# Patient Record
Sex: MALE | URBAN - METROPOLITAN AREA
[De-identification: names, ages, dates, MRNs, and addresses within clinical notes are randomized per-mention and may not be internally consistent; named-entity substitution may affect disease eponyms.]

---

## 2022-08-02 ENCOUNTER — OFFICE (OUTPATIENT)
Dept: URBAN - METROPOLITAN AREA CLINIC 12 | Facility: CLINIC | Age: 57
Setting detail: OPHTHALMOLOGY
End: 2022-08-02
Payer: COMMERCIAL

## 2022-08-02 DIAGNOSIS — H20.011: ICD-10-CM

## 2022-08-02 PROCEDURE — 92004 COMPRE OPH EXAM NEW PT 1/>: CPT | Performed by: OPTOMETRIST

## 2022-08-02 ASSESSMENT — KERATOMETRY
OS_K1POWER_DIOPTERS: 43.50
OD_K2POWER_DIOPTERS: 45.00
OD_AXISANGLE_DEGREES: 085
OS_K2POWER_DIOPTERS: 43.75
OS_AXISANGLE_DEGREES: 006
OD_K1POWER_DIOPTERS: 43.75

## 2022-08-02 ASSESSMENT — REFRACTION_AUTOREFRACTION
OS_AXIS: 100
OD_SPHERE: -4.50
OS_SPHERE: -4.75
OD_AXIS: 153
OD_CYLINDER: -0.75
OS_CYLINDER: -0.25

## 2022-08-02 ASSESSMENT — CONFRONTATIONAL VISUAL FIELD TEST (CVF)
OS_FINDINGS: FULL
OD_FINDINGS: FULL

## 2022-08-02 ASSESSMENT — TONOMETRY
OS_IOP_MMHG: 15
OD_IOP_MMHG: 13

## 2022-08-02 ASSESSMENT — SPHEQUIV_DERIVED
OS_SPHEQUIV: -4.875
OD_SPHEQUIV: -4.875

## 2022-08-02 ASSESSMENT — VISUAL ACUITY
OS_BCVA: 20/30-2
OD_BCVA: 20/25

## 2022-08-02 ASSESSMENT — AXIALLENGTH_DERIVED
OS_AL: 25.6033
OD_AL: 25.2829

## 2022-08-03 ENCOUNTER — OFFICE (OUTPATIENT)
Dept: URBAN - METROPOLITAN AREA CLINIC 12 | Facility: CLINIC | Age: 57
Setting detail: OPHTHALMOLOGY
End: 2022-08-03
Payer: COMMERCIAL

## 2022-08-03 ENCOUNTER — RX ONLY (RX ONLY)
Age: 57
End: 2022-08-03

## 2022-08-03 DIAGNOSIS — H20.011: ICD-10-CM

## 2022-08-03 PROCEDURE — 92012 INTRM OPH EXAM EST PATIENT: CPT | Performed by: OPTOMETRIST

## 2022-08-03 ASSESSMENT — REFRACTION_AUTOREFRACTION
OD_CYLINDER: -1.00
OD_AXIS: 027
OS_AXIS: 000
OS_SPHERE: -5.50
OD_SPHERE: -4.75
OS_CYLINDER: SPHERE

## 2022-08-03 ASSESSMENT — TONOMETRY
OD_IOP_MMHG: 11
OS_IOP_MMHG: 14

## 2022-08-03 ASSESSMENT — VISUAL ACUITY
OD_BCVA: 20/30
OS_BCVA: 20/20-1

## 2022-08-03 ASSESSMENT — KERATOMETRY
OD_AXISANGLE_DEGREES: 115
OS_K2POWER_DIOPTERS: 44.25
OD_K1POWER_DIOPTERS: 43.50
OS_K1POWER_DIOPTERS: 44.00
OS_AXISANGLE_DEGREES: 086
OD_K2POWER_DIOPTERS: 44.25

## 2022-08-03 ASSESSMENT — CONFRONTATIONAL VISUAL FIELD TEST (CVF)
OD_FINDINGS: FULL
OS_FINDINGS: FULL

## 2022-08-03 ASSESSMENT — AXIALLENGTH_DERIVED: OD_AL: 25.6674

## 2022-08-03 ASSESSMENT — SPHEQUIV_DERIVED: OD_SPHEQUIV: -5.25

## 2022-08-08 ENCOUNTER — OFFICE (OUTPATIENT)
Dept: URBAN - METROPOLITAN AREA CLINIC 12 | Facility: CLINIC | Age: 57
Setting detail: OPHTHALMOLOGY
End: 2022-08-08
Payer: COMMERCIAL

## 2022-08-08 DIAGNOSIS — H20.011: ICD-10-CM

## 2022-08-08 PROCEDURE — 99213 OFFICE O/P EST LOW 20 MIN: CPT | Performed by: OPTOMETRIST

## 2022-08-08 ASSESSMENT — AXIALLENGTH_DERIVED
OS_AL: 25.7866
OD_AL: 25.7866

## 2022-08-08 ASSESSMENT — SPHEQUIV_DERIVED
OD_SPHEQUIV: -5.625
OS_SPHEQUIV: -5.625

## 2022-08-08 ASSESSMENT — KERATOMETRY
OD_K1POWER_DIOPTERS: 43.75
OD_K2POWER_DIOPTERS: 44.25
OS_K2POWER_DIOPTERS: 44.00
OS_AXISANGLE_DEGREES: 090
OS_K1POWER_DIOPTERS: 44.00
OD_AXISANGLE_DEGREES: 077

## 2022-08-08 ASSESSMENT — CONFRONTATIONAL VISUAL FIELD TEST (CVF)
OS_FINDINGS: FULL
OD_FINDINGS: FULL

## 2022-08-08 ASSESSMENT — REFRACTION_AUTOREFRACTION
OD_AXIS: 019
OS_CYLINDER: -0.25
OD_CYLINDER: -0.75
OS_SPHERE: -5.50
OD_SPHERE: -5.25
OS_AXIS: 137

## 2022-08-08 ASSESSMENT — VISUAL ACUITY
OD_BCVA: 20/25-
OS_BCVA: 20/30+2

## 2022-08-08 ASSESSMENT — TONOMETRY
OS_IOP_MMHG: 15
OD_IOP_MMHG: 17

## 2022-08-15 ENCOUNTER — OFFICE (OUTPATIENT)
Dept: URBAN - METROPOLITAN AREA CLINIC 12 | Facility: CLINIC | Age: 57
Setting detail: OPHTHALMOLOGY
End: 2022-08-15
Payer: COMMERCIAL

## 2022-08-15 DIAGNOSIS — H20.011: ICD-10-CM

## 2022-08-15 PROCEDURE — 99213 OFFICE O/P EST LOW 20 MIN: CPT | Performed by: OPTOMETRIST

## 2022-08-15 ASSESSMENT — KERATOMETRY
OS_K2POWER_DIOPTERS: 44.75
OD_AXISANGLE_DEGREES: 028
OS_AXISANGLE_DEGREES: 084
OD_K1POWER_DIOPTERS: 43.25
OD_K2POWER_DIOPTERS: 44.00
OS_K1POWER_DIOPTERS: 44.00

## 2022-08-15 ASSESSMENT — REFRACTION_AUTOREFRACTION
OS_AXIS: 169
OD_AXIS: 009
OS_SPHERE: -5.25
OS_CYLINDER: -0.75
OD_SPHERE: -5.25
OD_CYLINDER: -1.25

## 2022-08-15 ASSESSMENT — VISUAL ACUITY
OS_BCVA: 20/30-1
OD_BCVA: 20/25

## 2022-08-15 ASSESSMENT — TONOMETRY
OS_IOP_MMHG: 15
OD_IOP_MMHG: 20
OS_IOP_MMHG: 15

## 2022-08-15 ASSESSMENT — AXIALLENGTH_DERIVED
OD_AL: 26.0706
OS_AL: 25.623

## 2022-08-15 ASSESSMENT — SPHEQUIV_DERIVED
OS_SPHEQUIV: -5.625
OD_SPHEQUIV: -5.875

## 2022-08-15 ASSESSMENT — CONFRONTATIONAL VISUAL FIELD TEST (CVF)
OS_FINDINGS: FULL
OD_FINDINGS: FULL

## 2022-08-22 ENCOUNTER — OFFICE (OUTPATIENT)
Dept: URBAN - METROPOLITAN AREA CLINIC 12 | Facility: CLINIC | Age: 57
Setting detail: OPHTHALMOLOGY
End: 2022-08-22
Payer: COMMERCIAL

## 2022-08-22 DIAGNOSIS — H20.011: ICD-10-CM

## 2022-08-22 PROCEDURE — 92012 INTRM OPH EXAM EST PATIENT: CPT | Performed by: OPTOMETRIST

## 2022-08-22 ASSESSMENT — KERATOMETRY
OS_K1POWER_DIOPTERS: 40.00
OD_AXISANGLE_DEGREES: 065
OD_K2POWER_DIOPTERS: 40.00
OS_AXISANGLE_DEGREES: 090
OS_K2POWER_DIOPTERS: 40.00
OD_K1POWER_DIOPTERS: 39.25

## 2022-08-22 ASSESSMENT — REFRACTION_AUTOREFRACTION
OD_CYLINDER: -0.50
OD_AXIS: 014
OS_AXIS: 000
OS_SPHERE: PLANO
OD_SPHERE: -0.50
OS_CYLINDER: SPHERE

## 2022-08-22 ASSESSMENT — CONFRONTATIONAL VISUAL FIELD TEST (CVF)
OS_FINDINGS: FULL
OD_FINDINGS: FULL

## 2022-08-22 ASSESSMENT — AXIALLENGTH_DERIVED: OD_AL: 25.442

## 2022-08-22 ASSESSMENT — VISUAL ACUITY
OD_BCVA: 20/20
OS_BCVA: 20/20

## 2022-08-22 ASSESSMENT — TONOMETRY
OS_IOP_MMHG: 14
OD_IOP_MMHG: 14

## 2022-08-22 ASSESSMENT — SPHEQUIV_DERIVED: OD_SPHEQUIV: -0.75

## 2023-04-24 ENCOUNTER — APPOINTMENT (OUTPATIENT)
Dept: PULMONOLOGY | Facility: CLINIC | Age: 58
End: 2023-04-24

## 2023-07-26 ENCOUNTER — EMERGENCY (EMERGENCY)
Facility: HOSPITAL | Age: 58
LOS: 0 days | Discharge: HOME | End: 2023-07-26
Attending: STUDENT IN AN ORGANIZED HEALTH CARE EDUCATION/TRAINING PROGRAM
Payer: COMMERCIAL

## 2023-07-26 VITALS
HEART RATE: 55 BPM | RESPIRATION RATE: 17 BRPM | OXYGEN SATURATION: 97 % | SYSTOLIC BLOOD PRESSURE: 151 MMHG | DIASTOLIC BLOOD PRESSURE: 97 MMHG | TEMPERATURE: 98 F

## 2023-07-26 VITALS
RESPIRATION RATE: 18 BRPM | SYSTOLIC BLOOD PRESSURE: 136 MMHG | HEIGHT: 68 IN | TEMPERATURE: 98 F | HEART RATE: 64 BPM | WEIGHT: 195.11 LBS | DIASTOLIC BLOOD PRESSURE: 99 MMHG | OXYGEN SATURATION: 96 %

## 2023-07-26 DIAGNOSIS — Z90.89 ACQUIRED ABSENCE OF OTHER ORGANS: ICD-10-CM

## 2023-07-26 DIAGNOSIS — M54.9 DORSALGIA, UNSPECIFIED: ICD-10-CM

## 2023-07-26 DIAGNOSIS — M54.32 SCIATICA, LEFT SIDE: ICD-10-CM

## 2023-07-26 PROCEDURE — 99284 EMERGENCY DEPT VISIT MOD MDM: CPT

## 2023-07-26 PROCEDURE — 99283 EMERGENCY DEPT VISIT LOW MDM: CPT

## 2023-07-26 RX ORDER — ACETAMINOPHEN 500 MG
975 TABLET ORAL ONCE
Refills: 0 | Status: COMPLETED | OUTPATIENT
Start: 2023-07-26 | End: 2023-07-26

## 2023-07-26 RX ORDER — MORPHINE SULFATE 50 MG/1
15 CAPSULE, EXTENDED RELEASE ORAL ONCE
Refills: 0 | Status: DISCONTINUED | OUTPATIENT
Start: 2023-07-26 | End: 2023-07-26

## 2023-07-26 RX ORDER — MORPHINE SULFATE 50 MG/1
1 CAPSULE, EXTENDED RELEASE ORAL
Qty: 12 | Refills: 0
Start: 2023-07-26 | End: 2023-07-28

## 2023-07-26 RX ADMIN — Medication 975 MILLIGRAM(S): at 18:06

## 2023-07-26 RX ADMIN — MORPHINE SULFATE 15 MILLIGRAM(S): 50 CAPSULE, EXTENDED RELEASE ORAL at 18:48

## 2023-07-26 NOTE — ED PROVIDER NOTE - NSFOLLOWUPINSTRUCTIONS_ED_ALL_ED_FT
Sciatica    Take Tylenol 650-1000 mg every 4-6 hours as needed for pain. Do not exceed 4,000 mg in a 24 hour period. Take Motrin 600-800 mg every 8 hours as needed for moderate pain -- take with food. Take Morphine as prescribed for severe pain only.       WHAT YOU NEED TO KNOW:  Sciatica is a condition that causes pain along your sciatic nerve. The sciatic nerve runs from your spine through both sides of your buttocks. It then runs down the back of your thigh, into your lower leg and foot. Your sciatic nerve may be compressed, inflamed, irritated, or stretched.  DISCHARGE INSTRUCTIONS:  Medicines:   •NSAIDs: These medicines decrease swelling and pain. NSAIDs are available without a doctor's order. Ask your healthcare provider which medicine is right for you. Ask how much to take and when to take it. Take as directed. NSAIDs can cause stomach bleeding or kidney problems if not taken correctly.  •Acetaminophen: This medicine decreases pain. Acetaminophen is available without a doctor's order. Ask how much to take and when to take it. Follow directions. Acetaminophen can cause liver damage if not taken correctly.  •Muscle relaxers help decrease pain and muscle spasms.  •Take your medicine as directed. Contact your healthcare provider if you think your medicine is not helping or if you have side effects. Tell him or her if you are allergic to any medicine. Keep a list of the medicines, vitamins, and herbs you take. Include the amounts, and when and why you take them. Bring the list or the pill bottles to follow-up visits. Carry your medicine list with you in case of an emergency.  Follow up with your healthcare provider as directed: Write down your questions so you remember to ask them during your visits.   Manage your symptoms:   •Activity: Decrease your activity. Do not lift heavy objects or twist your back for at least 6 weeks. Slowly return to your usual activity.  •Ice: Ice helps decrease swelling and pain. Ice may also help prevent tissue damage. Use an ice pack, or put crushed ice in a plastic bag. Cover it with a towel and place it on your low back or leg for 15 to 20 minutes every hour or as directed.  •Heat: Heat helps decrease pain and muscle spasms. Apply heat on the area for 20 to 30 minutes every 2 hours for as many days as directed.   •Physical therapy: You may need to see a physical therapist to teach you exercises to help improve movement and strength, and to decrease pain. An occupational therapist teaches you skills to help with your daily activities.   •Use assistive devices if directed: You may need to wear back support, such as a back brace. You may need crutches, a cane, or a walker to decrease stress on your lower back and leg muscles. Ask your healthcare provider for more information about assistive devices and how to use them correctly.  Self-care:   •Avoid pressure on your back and legs: Do not lift heavy objects, or stand or sit for long periods of time.  •Lift objects safely: Keep your back straight and bend your knees when you  an object. Do not bend or twist your back when you lift.  •Maintain a healthy weight: Ask your healthcare provider how much you should weigh. Ask him to help you create a weight loss plan if you are overweight.   •Exercise: Ask your healthcare provider about the best stretching, warmup, and exercise plan for you.   Contact your healthcare provider if:   •You have pain in your lower back at night or when resting.  •You have pain in your lower back with numbness below the knee.  •You have weakness in one leg only.  •You have questions or concerns about your condition or care.  Seek care immediately or call 911 if:   •You have trouble holding back your urine or bowel movements.  •You have weakness in both legs.  •You have numbness in your groin or buttocks.

## 2023-07-26 NOTE — ED ADULT TRIAGE NOTE - CHIEF COMPLAINT QUOTE
PT presents to er with complaints of lle numbness, left arm numbness with severe back pain, states he has had back problems for past 10yrs and it is more severe today, pt was not able to stand, no recent trauma, injury at this time, no incontinence urine/bowel.

## 2023-07-26 NOTE — ED PROVIDER NOTE - CLINICAL SUMMARY MEDICAL DECISION MAKING FREE TEXT BOX
57-year-old male presents emergency department for left buttock pain radiating down the left leg.  Has a history of prior back pain.  No red flag symptoms for back pain.  Patient neurologically intact.  Likely sciatica, no concern for cauda equina or conus medullaris.  Plan for pain control, ambulation, discharged with orthopedic spine follow-up.

## 2023-07-26 NOTE — ED PROVIDER NOTE - OBJECTIVE STATEMENT
Pt is a 57y male with a PMH of chronic back issues, thyroidectomy presents to the nonambulatory (baseline ambulatory) ED BIBEMS c/o severe back pain w/ associated radiating L UE/LE numbness onset w/o exertion. Pt noted a history of "throwing his back out" 10 years and 20 years PTA w/ epidural injection. Denies smoking, recent trauma, urinary/bowel incontinence. Takes meds for cholesterol, lithium, Synthroid, and blood thinners, took Advil PTA.

## 2023-07-26 NOTE — ED PROVIDER NOTE - PROGRESS NOTE DETAILS
Patient reassessed.  Pain improved.  Patient able to ambulate with assistance.  Discussed need for spine follow-up, physical therapy with leg stretching outpatient, and PMD follow-up.  Patient advised to alternate Tylenol, NSAIDs, and p.o. morphine.  Discussed return precautions and all questions answered. Pt in agreement w/ plan. CAOx3, NAD, VSS. Stable for d/c.

## 2023-07-26 NOTE — ED ADULT NURSE NOTE - NSFALLHARMRISKINTERV_ED_ALL_ED

## 2023-07-26 NOTE — ED PROVIDER NOTE - PHYSICAL EXAMINATION
GENERAL: A&Ox4, non-toxic appearing, no acute distress  HEENT: NCAT, EOMI, oral mucosa moist, normal conjunctiva  RESP: no respiratory distress, speaking in full sentences  CV: RRR  MSK: no visible deformities, +SLR on left   NEURO: no focal sensory or motor deficits, CN 2-12 grossly intact  SKIN: warm, normal color, well perfused, no rash  PSYCH: normal affect

## 2023-07-26 NOTE — ED ADULT NURSE REASSESSMENT NOTE - NS ED NURSE REASSESS COMMENT FT1
Received report from outgoing RN. Pt is awake, laying in bed, appeared to be in no distress, pain rated 2/10 resting and 5/10 with activity. Family member at bedside. Pt denies any needs at this time. Will continue to monitor.

## 2023-07-26 NOTE — ED PROVIDER NOTE - PATIENT PORTAL LINK FT
You can access the FollowMyHealth Patient Portal offered by [facility] by registering at the following website: [email or web address]. By joining [facility]’s FollowMyHealth portal, you will also be able to view your health information using other applications (apps) compatible with our system.

## 2023-08-01 ENCOUNTER — APPOINTMENT (OUTPATIENT)
Dept: PHYSICAL MEDICINE AND REHAB | Facility: CLINIC | Age: 58
End: 2023-08-01

## 2023-08-08 PROBLEM — Z00.00 ENCOUNTER FOR PREVENTIVE HEALTH EXAMINATION: Status: ACTIVE | Noted: 2023-08-08

## 2023-08-11 ENCOUNTER — TRANSCRIPTION ENCOUNTER (OUTPATIENT)
Age: 58
End: 2023-08-11

## 2023-08-11 ENCOUNTER — APPOINTMENT (OUTPATIENT)
Dept: ORTHOPEDIC SURGERY | Facility: CLINIC | Age: 58
End: 2023-08-11
Payer: COMMERCIAL

## 2023-08-11 VITALS
DIASTOLIC BLOOD PRESSURE: 88 MMHG | HEIGHT: 67 IN | HEART RATE: 71 BPM | WEIGHT: 195 LBS | SYSTOLIC BLOOD PRESSURE: 145 MMHG | BODY MASS INDEX: 30.61 KG/M2

## 2023-08-11 DIAGNOSIS — M54.16 RADICULOPATHY, LUMBAR REGION: ICD-10-CM

## 2023-08-11 DIAGNOSIS — Z87.19 PERSONAL HISTORY OF OTHER DISEASES OF THE DIGESTIVE SYSTEM: ICD-10-CM

## 2023-08-11 DIAGNOSIS — Z86.39 PERSONAL HISTORY OF OTHER ENDOCRINE, NUTRITIONAL AND METABOLIC DISEASE: ICD-10-CM

## 2023-08-11 DIAGNOSIS — M54.42 LUMBAGO WITH SCIATICA, LEFT SIDE: ICD-10-CM

## 2023-08-11 DIAGNOSIS — M43.16 SPONDYLOLISTHESIS, LUMBAR REGION: ICD-10-CM

## 2023-08-11 DIAGNOSIS — F31.9 BIPOLAR DISORDER, UNSPECIFIED: ICD-10-CM

## 2023-08-11 DIAGNOSIS — Z86.79 PERSONAL HISTORY OF OTHER DISEASES OF THE CIRCULATORY SYSTEM: ICD-10-CM

## 2023-08-11 DIAGNOSIS — M47.816 SPONDYLOSIS W/OUT MYELOPATHY OR RADICULOPATHY, LUMBAR REGION: ICD-10-CM

## 2023-08-11 DIAGNOSIS — M48.061 SPINAL STENOSIS, LUMBAR REGION WITHOUT NEUROGENIC CLAUDICATION: ICD-10-CM

## 2023-08-11 DIAGNOSIS — M43.06 SPONDYLOLYSIS, LUMBAR REGION: ICD-10-CM

## 2023-08-11 PROCEDURE — 99204 OFFICE O/P NEW MOD 45 MIN: CPT

## 2023-08-11 RX ORDER — IBUPROFEN 800 MG/1
800 TABLET, FILM COATED ORAL
Qty: 90 | Refills: 0 | Status: ACTIVE | COMMUNITY
Start: 2023-08-11 | End: 1900-01-01

## 2023-08-11 NOTE — HISTORY OF PRESENT ILLNESS
[de-identified] : This 58-year-old male had back problems at age 27 and was told he had a herniated disc, surgery was recommended but the symptoms eventually resolved.  He had another episode of back pain stepping off a curb 10 years ago and no symptoms took weeks to resolve.  3 weeks ago he had the spontaneous onset of some mild left-sided lower back pain but mostly with radiation to the left buttock and thigh but not below the knee.  The symptoms are no worse coughing or sneezing but they are worse forcing to move his bowels.  He has not had night pain.  The pain is worse sitting and to a lesser extent worse standing and walking.  He was seen in the ER at [facility] and then later admitted to [facility].  He had CT scan of the lumbar spine that revealed revealed an attempted sacralization of L5 but there are degenerative changes at that levels proving there is some motion and there appears to be a vacuum disc and some air in the canal at that level.  There are changes of marked facet arthrosis at L4-5 with a minimal spondylolisthesis and some central canal stenosis at that level as well as bilateral foraminal stenosis.  Treatment has been bedrest.  Prior to the ER he had been on Advil and Aleve.  He has had 2 Medrol Dosepaks and was put on gabapentin and had an epidural steroid injection all with only mild improvement.  He had thyroid surgery for Graves' disease 2 years ago.  He is on lithium and Cymbalta for bipolar disease as well as medication for hypertension and hyperlipidemia.  He has been on Synthroid since his thyroid surgery.  He has been slightly better and capable of ambulating with a walker at home over the last few days.  He has not had a lithium level for 6 months but tells me that he takes ibuprofen on quite a regular basis even before this recent exacerbation of symptoms from his back. [Pain Location] : pain [Walking] : walking [Sitting] : sitting [Standing] : standing

## 2023-08-11 NOTE — DISCUSSION/SUMMARY
[Medication Risks Reviewed] : Medication risks reviewed [de-identified] : He will rest and use moist heat.  He clearly has symptoms of a left-sided L4 radiculopathy.  I find no muscular weakness.  I discussed with him that the majority of patients will respond to nonsurgical treatment but he needs to be patient and give it time.  Ibuprofen has been his go to in the past and he will take 800 mg 3 times a day.  I discussed with him that that can affect his lithium level and he will get it checked in the next day or 2 by his psychiatrist.  If he is not making any improvement over 10 days or so I will see him for follow-up in 2 weeks.  If he is making improvement I will see him for follow-up in 3 weeks.  He will call if there are problems with the medication.  I discussed with him that if this comes to surgical treatment it would need to be extensive in light of the marked changes of facet arthrosis with spinal instability and significant stenosis he would need a laminectomy and an instrumented fusion.

## 2023-08-11 NOTE — REASON FOR VISIT
[Initial Visit] : an initial visit for [Degenerative Joint Disease] : degenerative joint disease [Radiculopathy] : radiculopathy [Spinal Stenosis] : spinal stenosis [Spouse] : spouse

## 2023-08-11 NOTE — PHYSICAL EXAM
[de-identified] : He seems fully alert and oriented with a normal mood and affect.  He is lying down on the examining table as I enter the room and take the history.  He moves with difficulty attempting to stand and ambulate a few steps secondary to pain in the left leg.  There is no paravertebral muscle spasm, sciatic notch tenderness or trochanteric tenderness.  Range of motion of the spine is very limited secondary to the left leg pain.  There are no cutaneous abnormalities or palpable bony defects of the spine.  There is no evidence of shortness of breath or respiratory distress.  His lower extremity neurological examination revealed a 1+ knee jerk on the right with an absent left knee jerk.  Ankle jerks are 1+ and symmetrical.  Motor power is normal to manual testing in all lower extremity groups and sensation is normal to light touch in all dermatomes.  Straight leg raising is limited bilaterally secondary to left leg pain.  His knees show no evidence of any synovitis or effusion.  Vascular examination shows no evidence of significant varicosities and there is no lymphedema. [de-identified] : I reviewed CT scan of the hip which revealed minimal degenerative changes.  As noted above I reviewed the CT scan of the lumbar spine.  I reviewed a report of an MRI of the lumbar spine that reveals much the same as the CT scan of the lumbar spine.

## 2023-10-18 NOTE — ED PROVIDER NOTE - NSDCPRINTRESULTS_ED_ALL_ED
Patient requests all Lab, Cardiology, and Radiology Results on their Discharge Instructions
4 = No assist / stand by assistance